# Patient Record
Sex: FEMALE | URBAN - METROPOLITAN AREA
[De-identification: names, ages, dates, MRNs, and addresses within clinical notes are randomized per-mention and may not be internally consistent; named-entity substitution may affect disease eponyms.]

---

## 2023-05-30 ENCOUNTER — EMERGENCY (EMERGENCY)
Facility: HOSPITAL | Age: 88
LOS: 1 days | Discharge: HOME | End: 2023-05-30
Attending: EMERGENCY MEDICINE | Admitting: EMERGENCY MEDICINE
Payer: MEDICARE

## 2023-05-30 VITALS
DIASTOLIC BLOOD PRESSURE: 86 MMHG | SYSTOLIC BLOOD PRESSURE: 124 MMHG | TEMPERATURE: 98 F | HEART RATE: 74 BPM | OXYGEN SATURATION: 97 % | RESPIRATION RATE: 18 BRPM

## 2023-05-30 VITALS
DIASTOLIC BLOOD PRESSURE: 70 MMHG | HEART RATE: 67 BPM | RESPIRATION RATE: 16 BRPM | WEIGHT: 125 LBS | TEMPERATURE: 98 F | SYSTOLIC BLOOD PRESSURE: 118 MMHG | OXYGEN SATURATION: 95 % | HEIGHT: 59 IN

## 2023-05-30 DIAGNOSIS — S42.401A UNSPECIFIED FRACTURE OF LOWER END OF RIGHT HUMERUS, INITIAL ENCOUNTER FOR CLOSED FRACTURE: ICD-10-CM

## 2023-05-30 PROCEDURE — 73080 X-RAY EXAM OF ELBOW: CPT

## 2023-05-30 PROCEDURE — 99284 EMERGENCY DEPT VISIT MOD MDM: CPT

## 2023-05-30 PROCEDURE — 73080 X-RAY EXAM OF ELBOW: CPT | Mod: 26,RT

## 2023-05-30 PROCEDURE — 73060 X-RAY EXAM OF HUMERUS: CPT | Mod: 26,RT

## 2023-05-30 PROCEDURE — 24565 CLTX HUM EPCNDYLR FX W/MNPJ: CPT | Mod: RT

## 2023-05-30 PROCEDURE — 99285 EMERGENCY DEPT VISIT HI MDM: CPT | Mod: 25

## 2023-05-30 PROCEDURE — 73060 X-RAY EXAM OF HUMERUS: CPT

## 2023-05-30 PROCEDURE — 73090 X-RAY EXAM OF FOREARM: CPT | Mod: 26,LT

## 2023-05-30 PROCEDURE — 73090 X-RAY EXAM OF FOREARM: CPT

## 2023-05-30 PROCEDURE — 99282 EMERGENCY DEPT VISIT SF MDM: CPT

## 2023-05-30 PROCEDURE — 73070 X-RAY EXAM OF ELBOW: CPT

## 2023-05-30 RX ORDER — IBUPROFEN 200 MG
400 TABLET ORAL ONCE
Refills: 0 | Status: COMPLETED | OUTPATIENT
Start: 2023-05-30 | End: 2023-05-30

## 2023-05-30 RX ADMIN — Medication 400 MILLIGRAM(S): at 14:20

## 2023-05-30 RX ADMIN — Medication 400 MILLIGRAM(S): at 13:42

## 2023-05-30 NOTE — ED ADULT NURSE NOTE - OBJECTIVE STATEMENT
received pt s/o mechanical trip and fall at 11:30am today c/o L elbow pain.  Denies hitting her head, denies dizziness prior to fall.  Denies neck/spine pain.   No additional injury.  ROM to L elbow limted due to pain, denies pain to shoulder/wrist. BN

## 2023-05-30 NOTE — ED PROVIDER NOTE - PATIENT PORTAL LINK FT
You can access the FollowMyHealth Patient Portal offered by [facility] by registering at the following website: [email or web address]. By joining [employer]’s FollowMyHealth portal, you will also be able to view your health information using other applications (apps) compatible with our system.

## 2023-05-30 NOTE — ED PROVIDER NOTE - MUSCULOSKELETAL, MLM
No midline tenderness. FROM SHAHEED GARDNER. RUE: chronic dec ROM shoulder, ttp to elbow with swelling, dec rom due to pain, deformity, wrist and fingers FROM, +NVI.

## 2023-05-30 NOTE — ED ADULT NURSE NOTE - NSFALLHARMRISKINTERV_ED_ALL_ED

## 2023-05-30 NOTE — ED ADULT TRIAGE NOTE - CHIEF COMPLAINT QUOTE
Patient via EMS with c/o mechanical trip and fall, landed on right elbow, c/o right elbow pain. patient denies head strike or LOC. Patient only takes multivitamin daily.

## 2023-05-30 NOTE — ED PROVIDER NOTE - NSFOLLOWUPINSTRUCTIONS_ED_ALL_ED_FT
A distal humerus elbow fracture is a break in the upper arm bone (humerus). The break happens in the lower (distal) part of the humerus, near the elbow.    What are the causes?  This condition may be caused by:  A hard, direct hit. This may happen as a result of being hit with an object, being in a motor vehicle accident, or having a collision with another person.  Falling onto an outstretched arm.  What increases the risk?  You are more likely to develop this condition if:  You are young.  You are elderly and have low bone density(osteoporosis).  You participate in activities where you are likely to have an injury or a fall, such as:  Gymnastics.  Football and other contact sports.  Skateboarding.  Biking.  What are the signs or symptoms?  Symptoms of this condition include:  Pain that is sudden and severe.  Tenderness of the elbow as well as the area near the elbow.  Inability to move the elbow or straighten the arm.  Swelling of the elbow.  Bruising.  Stiffness.  A feeling that the elbow is unstable.  In severe cases, the bone can protrude through the skin. This type of elbow fracture is a medical emergency.    How is this diagnosed?  This condition is diagnosed based on your symptoms, your medical history, and a physical exam.    You may also have X-rays to confirm the diagnosis.    How is this treated?  This condition may be treated by:  Wearing a splint or cast and a sling to hold your elbow still (immobilized) while it heals.  Applying ice to your elbow to relieve pain and reduce swelling.  Taking NSAIDs, such as ibuprofen, to reduce pain and swelling.  Doing exercises to help you regain movement (physical therapy).  Having surgery if the bones in your elbow are out of position.  Follow these instructions at home:  Medicines    Take over-the-counter and prescription medicines only as told by your health care provider.  Ask your health care provider if the medicine prescribed to you:  Requires you to avoid driving or using heavy machinery.  Can cause constipation. You may need to take these actions to prevent or treat constipation:  Drink enough fluid to keep your urine pale yellow.  Take over-the-counter or prescription medicines.  Eat foods that are high in fiber, such as beans, whole grains, and fresh fruits and vegetables.  Limit foods that are high in fat and processed sugars, such as fried or sweet foods.  If you have a cast or splint:    Do not put pressure on any part of the cast or splint until it is fully hardened. This may take several hours.  Check the skin around it every day. Tell your health care provider about any concerns.  If you have a cast:    Do not stick anything inside it to scratch your skin. Doing that increases your risk of infection.  You may put lotion on dry skin around the edges of the cast. Do not put lotion on the skin underneath it.  Keep it clean and dry.  If you have a splint or sling:    Wear it as told by your health care provider. Remove it only as told by your health care provider.  Loosen it if your fingers tingle, become numb, or turn cold and blue.  Keep it clean and dry.  Bathing    Do not take baths, swim, or use a hot tub until your health care provider approves. Ask your health care provider if you may take showers. You may only be allowed to take sponge baths.  If the cast, splint, or sling is not waterproof:  Do not let it get wet.  Cover it with a waterproof covering when you take a bath or shower.  Managing pain, stiffness, and swelling      If directed, put ice on the injured area.  If you have a removable splint or sling, remove it as told by your health care provider.  Put ice in a plastic bag.  Place a towel between your skin and the bag or between your cast and the bag.  Leave the ice on for 20 minutes, 2–3 times a day.  Move your fingers often to reduce stiffness and swelling.  Raise (elevate) the injured area above the level of your heart while you are sitting or lying down.  Activity    Ask your health care provider when it is safe to drive if you have a cast, splint, or sling on your arm.  Do exercises as told by your health care provider.  Return to your normal activities as told by your health care provider. Ask your health care provider what activities are safe for you.  Safety    Do not use the injured limb to support your body weight until your health care provider says that you can.  Do not lift anything that is heavier than 10 lb (4.5 kg), or the limit that you are told, until your health care provider says that it is safe.  Do not pull or push objects with your injured arm until your health care provider says that it is safe.  General instructions    Do not use any products that contain nicotine or tobacco, such as cigarettes, e-cigarettes, and chewing tobacco. These can delay bone healing. If you need help quitting, ask your health care provider.  Keep all follow-up visits as told by your health care provider. This is important.  How is this prevented?  Wear the appropriate protective equipment during sports, such as elbow pads.  Make sure to use equipment that fits you.  Maintain physical fitness, including:  Strength.  Flexibility.  Contact a health care provider if you have:  Pain that gets worse or does not improve.  Get help right away if you have:  Ongoing numbness or weakness in your elbow, hand, or fingers.  Discoloration of your fingers.  Severe pain that is much worse or different than before.  Swelling that suddenly worsens.  Summary  A distal humerus elbow fracture is a break in the upper arm bone (humerus) near the elbow.  This condition may be caused by a hard, direct hit or falling onto an outstretched arm.  This condition is often treated with a splint or cast and a sling to hold your elbow still (immobilized) while it heals. Surgery may be needed if the bones in your elbow are out of position.  Contact your health care provider if you have pain that gets worse or does not improve.  This information is not intended to replace advice given to you by your health care provider. Make sure you discuss any questions you have with your health care provider.

## 2023-05-30 NOTE — ED PROVIDER NOTE - ATTENDING APP SHARED VISIT CONTRIBUTION OF CARE
Patient came into the ED c/o right elbow pain s/p trip and fall injuring right elbow. h/o right shoulder injury in the past.     A&Ox3, NAD. PERRLx2. neck supple. no midline tenderness. Lungs CTA, equal and b/l, no r/r/w. S1S2, no murmurs, RRR. Abdomen soft, nt/nd. +swelling/deformity/extra movement to right elbow. good radial pulse. FROM wrist/shoulder. good cap refill.

## 2023-05-30 NOTE — CONSULT NOTE ADULT - SUBJECTIVE AND OBJECTIVE BOX
Pt Name: MASON JURADO    MRN: [id number]    HPI: Patient is a 88y Female who presents to the ED s/p fall earlier today which walking outside. Patient states she fell onto her right side and used her elbow to break her fall. Patient denies any other injuries, LOC, HS, numbness or tingling. At baseline, patient ambulates independently and is RHD. Patient states pain is only worse with movement and while at rest, pain is tolerable. Patient also says that 2 years ago she had a fall where she also landed on her right arm and broke her humerus. At that time, patient opted for non operative management and since then, has limited ROM of her shoulder.     PAST MEDICAL & SURGICAL HISTORY:  Allergies: No Known Allergies        PHYSICAL EXAM:    Vital Signs Last 24 Hrs  T(C): 36.5 (30 May 2023 12:13), Max: 36.5 (30 May 2023 12:13)  T(F): 97.7 (30 May 2023 12:13), Max: 97.7 (30 May 2023 12:13)  HR: 67 (30 May 2023 12:13) (67 - 67)  BP: 118/70 (30 May 2023 12:13) (118/70 - 118/70)  BP(mean): --  RR: 16 (30 May 2023 12:13) (16 - 16)  SpO2: 95% (30 May 2023 12:13) (95% - 95%)    Parameters below as of 30 May 2023 12:13  Patient On (Oxygen Delivery Method): room air        Physical Exam:  General: NAD, Alert, Awake and oriented  Respiratory: Symmetric chest wall expansion bilaterally, no accessory muscle use  RIGHT UE: No open skin. +edema about the elbow.  Full baseline painless ROM at shoulder, wrist, and . Elbow ROM limited  2/2 mild pain. +TTP about the elbow. SILT in axillary, radial, median, and ulnar distributions. 2+ radial pulse with brisk cap refill at distal finger tips. Compartments soft and compressible. Strength 5/5.    Secondary Survey:   RLE/LLE/LUE: No TTP over bony prominences, SILT, palpable pulses, full/painless range of motion, compartments soft      Imaging: Xray Right Elbow- Right elbow medial epicondyle fracture. Follow up official read      Orthopedic A/P:    Pt is a  88y Female with Right elbow fracture     Procedure:   Closed reduction was performed and a well molded, well padded splint was applied. The patient tolerated the procedure well and there we no complications. The patient's post-reduction neurovascular exam was unchanged. Post-reduction xrays were performed      -Pain control PRN  -NWB RUE, sling   -Keep splint/dressing clean and dry.   -ICE and elevate  -Do not remove dressing/splint until follow up in the office.   -Follow up with Dr. Clark within 1 week. Please call the office to make an appointment.   -All imaging and PE discussed with Dr. Szymanski who is aware and approves of plan.

## 2023-05-30 NOTE — ED PROVIDER NOTE - NS ED ATTENDING STATEMENT MOD
This was a shared visit with the MARYCRUZ. I reviewed and verified the documentation and independently performed the documented:

## 2023-05-30 NOTE — ED PROVIDER NOTE - OBJECTIVE STATEMENT
88 F c/o right elbow pain s/p trip and fall today. Denies head injury, blood thinners. Has prior fx to right shoulder. Right hand dominant. Ambulatory.